# Patient Record
Sex: FEMALE | Race: WHITE | NOT HISPANIC OR LATINO | Employment: OTHER | URBAN - METROPOLITAN AREA
[De-identification: names, ages, dates, MRNs, and addresses within clinical notes are randomized per-mention and may not be internally consistent; named-entity substitution may affect disease eponyms.]

---

## 2017-01-04 ENCOUNTER — TELEPHONE (OUTPATIENT)
Dept: OBSTETRICS AND GYNECOLOGY | Facility: CLINIC | Age: 42
End: 2017-01-04

## 2017-01-04 NOTE — TELEPHONE ENCOUNTER
----- Message from Rebeka Fowler sent at 1/3/2017  8:08 AM CST -----  Contact: pt   X_  1st Request  _  2nd Request  _  3rd Request        Who:HUMPHREY ESPOSITO [[id number]]    Why: Patient states she would like to cancel her surgery     What Number to Call Back: [phone]    When to Expect a call back: (Before the end of the day)   -- if call after 3:00 call back will be tomorrow.

## 2019-10-07 ENCOUNTER — HOSPITAL ENCOUNTER (EMERGENCY)
Facility: HOSPITAL | Age: 44
Discharge: HOME | End: 2019-10-07
Attending: EMERGENCY MEDICINE
Payer: COMMERCIAL

## 2019-10-07 VITALS
DIASTOLIC BLOOD PRESSURE: 80 MMHG | OXYGEN SATURATION: 98 % | RESPIRATION RATE: 20 BRPM | BODY MASS INDEX: 25.69 KG/M2 | TEMPERATURE: 98 F | HEIGHT: 63 IN | WEIGHT: 145 LBS | HEART RATE: 82 BPM | SYSTOLIC BLOOD PRESSURE: 142 MMHG

## 2019-10-07 DIAGNOSIS — K59.00 CONSTIPATION, UNSPECIFIED CONSTIPATION TYPE: Primary | ICD-10-CM

## 2019-10-07 DIAGNOSIS — K59.00 CONSTIPATION: ICD-10-CM

## 2019-10-07 LAB
B-HCG UR QL: NEGATIVE
CTP QC/QA: YES

## 2019-10-07 PROCEDURE — 81025 URINE PREGNANCY TEST: CPT | Performed by: EMERGENCY MEDICINE

## 2019-10-07 PROCEDURE — 99284 EMERGENCY DEPT VISIT MOD MDM: CPT | Mod: 25

## 2019-10-07 RX ORDER — GLYCERIN 1 G/1
1 SUPPOSITORY RECTAL
Qty: 1 SUPPOSITORY | Refills: 0 | Status: SHIPPED | OUTPATIENT
Start: 2019-10-07 | End: 2020-03-02

## 2019-10-07 NOTE — ED NOTES
Pt reports that she is unable to have a BM. She reports she has taken stool softeners, fleets enemas, having pain while trying to have a BM. Had gastroenteritis last week. Pt has hx of Crohn's Disease    APPEARANCE: Alert, oriented and in no acute distress.  CARDIAC: Normal rate and rhythm, no murmur heard.   PERIPHERAL VASCULAR: peripheral pulses present. Normal cap refill. No edema. Warm to touch.    RESPIRATORY:Normal rate and effort, breath sounds clear bilaterally throughout chest. Respirations are equal and unlabored no obvious signs of distress.  GASTRO: soft, bowel sounds hypoactive, Tenderness and bloating to abdomen over entire abdomen, no abdominal distention. No BM for several days despite taking laxatives, stool softeners and fleets enemas. +Nausea. Pt has hx of Crohn's Disease  MUSC: Full ROM. No bony tenderness or soft tissue tenderness. No obvious deformity.  SKIN: Skin is warm and dry, normal skin turgor, mucous membranes moist.  NEURO: 5/5 strength major flexors/extensors bilaterally. Sensory intact to light touch bilaterally. [place] coma scale: eyes open spontaneously-4, oriented & converses-5, obeys commands-6. No neurological abnormalities.   MENTAL STATUS: awake, alert and aware of environment.  EYE: PERRL, both eyes: pupils brisk and reactive to light. Normal size.  ENT: EARS: no obvious drainage. NOSE: no active bleeding.

## 2019-10-07 NOTE — ED NOTES
Pt had to go to the bathroom during Dr. Au's evaluation. Pt directed to bathroom and given a urine cup for obtaining specimen.

## 2019-10-07 NOTE — ED PROVIDER NOTES
Encounter Date: 10/7/2019    SCRIBE #1 NOTE: I, Donn Palmer, am scribing for, and in the presence of, Lele Au MD.       History     Chief Complaint   Patient presents with    Constipation     States h/o gastroenteritis with N/V last week. No diarrhea reported. States now feels constipated with LBM last Tuesday. Taking laxatives and enema x 1 without results. Feels bloated and uncomfortable. No current N/V.      Rupal Chacon is a 44 y.o. female who  has a past medical history of Abnormal Pap smear (2012), Anemia, Crohn disease, and Crohn's disease.    The patient presents to the ED due to constipation starting 6 days ago.   Patient reports recent illness with nausea and vomiting last week (currently resolved). States she feels constipated and is concerned she may be impacted. Her last BM was about 6 days ago. States she normally has a BM daily. She has tried Dulcolax and a Fleet enema this morning without relief. She admits to urgency but has not had a full BM. She also reports some nausea and light-headedness this morning due to straining but denies any pain or bleeding. States she feels bloated and uncomfortable due to abdominal pressure. She has been passing lots of gas. She reports history of hemorrhoids. She denies history of impaction.    The history is provided by the patient.     Review of patient's allergies indicates:  No Known Allergies  Past Medical History:   Diagnosis Date    Abnormal Pap smear 2012    colpo with Dr. Salcedo    Anemia     teenager    Crohn disease     Crohn's disease     diagnosed 12/2013     Past Surgical History:   Procedure Laterality Date    COLPOSCOPY  2013    SPINAL FUSION  17 yo    scoliosis    thoracic spinal fusion      scoliosis     Family History   Problem Relation Age of Onset    Diabetes Mother     Hypertension Mother     Hyperlipidemia Mother     Hypertension Father     Cancer Paternal Grandfather         leukemia    Melanoma Neg Hx      Psoriasis Neg Hx     Lupus Neg Hx      Social History     Tobacco Use    Smoking status: Never Smoker    Smokeless tobacco: Never Used   Substance Use Topics    Alcohol use: Yes     Comment: occas/socially    Drug use: No     Review of Systems   Constitutional: Negative for fever.   HENT: Negative for sore throat.    Respiratory: Negative for shortness of breath.    Cardiovascular: Negative for chest pain.   Gastrointestinal: Positive for abdominal distention, abdominal pain, constipation, nausea and vomiting (currently resolved). Negative for blood in stool.   Genitourinary: Negative for dysuria.   Musculoskeletal: Negative for back pain.   Skin: Negative for rash.   Neurological: Positive for light-headedness. Negative for weakness.   Hematological: Does not bruise/bleed easily.   All other systems reviewed and are negative.      Physical Exam     Initial Vitals [10/07/19 0815]   BP Pulse Resp Temp SpO2   (!) 141/88 80 16 98.4 °F (36.9 °C) 98 %      MAP       --         Physical Exam    Nursing note and vitals reviewed.  Constitutional: She appears well-developed and well-nourished. No distress.   HENT:   Head: Normocephalic and atraumatic.   Eyes: EOM are normal. Pupils are equal, round, and reactive to light.   Neck: Normal range of motion. Neck supple.   Cardiovascular: Normal rate, regular rhythm, normal heart sounds and intact distal pulses.   Pulmonary/Chest: Breath sounds normal. No respiratory distress. She has no wheezes.   Abdominal: Soft. She exhibits no distension. There is no tenderness.   Genitourinary:   Genitourinary Comments: Multiple external hemorrhoids noted on BHUPENDRA.   No evidence of rectal impaction.  No stool palpated in rectal vault   Musculoskeletal: Normal range of motion. She exhibits no edema.   Neurological: She is alert and oriented to person, place, and time.   Skin: Skin is warm and dry.         ED Course   Procedures  Labs Reviewed   POCT URINE PREGNANCY          Imaging Results           X-Ray Abdomen AP 1 View (KUB) (Final result)  Result time 10/07/19 09:49:52    Final result by Ayesha Chavez MD (10/07/19 09:49:52)                 Impression:      See above.      Electronically signed by: Ayesha Chavez MD  Date:    10/07/2019  Time:    09:49             Narrative:    EXAMINATION:  XR ABDOMEN AP 1 VIEW    CLINICAL HISTORY:  Constipation, unspecified    TECHNIQUE:  AP View(s) of the abdomen was performed.    COMPARISON:  None    FINDINGS:  There is copious stool in the ascending and transverse colon.  No dilated bowel loops are seen to indicate obstruction.  There is dextroscoliosis and a spinal fusion rhonda is partially visualized.                                 Medical Decision Making:   Initial Assessment:   44 year-old female with history of Crohn's disease presents due to constipation starting 6 days ago.vsswnl.  PE noted for no stool in rectal vault.  Multiple hemorrhoids and likely scar tissue palpable.  Differential Diagnosis:   Ddx possible impaction, constipation, pregnancy  Clinical Tests:   Lab Tests: Ordered and Reviewed  Radiological Study: Ordered and Reviewed  ED Management:  Plan:  Obtain x-ray and enema glycerin suppository.  Reassess.                   ED Course as of Oct 07 1105   Mon Oct 07, 2019   1022 Patient without evidence of impaction abdomen remains benign and nontender to palpation soft throughout patient tolerating p.o. intake without any nausea or vomiting x-ray evident of large stool burden.  Offered patient an enema and suppository while in the ED prefers to attempt relieve the constipation while at home.  Patient reliable given follow-up instructions and precautions will DC home.  Patient has no further questions.    [DC]      ED Course User Index  [DC] Lele Au Jr., MD     Clinical Impression:       ICD-10-CM ICD-9-CM   1. Constipation, unspecified constipation type K59.00 564.00   2. Constipation K59.00 564.00           Disposition:    Disposition: Discharged  Condition: Stable         I, Lele Au,  personally performed the services described in this documentation. All medical record entries made by the scribe were at my direction and in my presence.  I have reviewed the chart and agree that the record reflects my personal performance and is accurate and complete. Lele Au M.D .now                   Lele Au Jr., MD  10/07/19 0745

## 2019-10-07 NOTE — ED NOTES
Dr. Au at bedside. Rectal exam performed with this nurse as chaperone. No impaction found. Xray will be ordered.

## 2020-03-02 ENCOUNTER — HOSPITAL ENCOUNTER (OUTPATIENT)
Dept: PREADMISSION TESTING | Facility: HOSPITAL | Age: 45
Discharge: HOME | End: 2020-03-02
Attending: OBSTETRICS & GYNECOLOGY
Payer: COMMERCIAL

## 2020-03-02 ENCOUNTER — LAB VISIT (OUTPATIENT)
Dept: LAB | Facility: HOSPITAL | Age: 45
End: 2020-03-02
Attending: OBSTETRICS & GYNECOLOGY
Payer: COMMERCIAL

## 2020-03-02 VITALS
BODY MASS INDEX: 26.98 KG/M2 | OXYGEN SATURATION: 100 % | WEIGHT: 158.06 LBS | TEMPERATURE: 99 F | HEART RATE: 64 BPM | DIASTOLIC BLOOD PRESSURE: 83 MMHG | SYSTOLIC BLOOD PRESSURE: 136 MMHG | HEIGHT: 64 IN | RESPIRATION RATE: 16 BRPM

## 2020-03-02 DIAGNOSIS — Z01.818 PREOP TESTING: Primary | ICD-10-CM

## 2020-03-02 LAB
ABO + RH BLD: NORMAL
BLD GP AB SCN CELLS X3 SERPL QL: NORMAL
ERYTHROCYTE [DISTWIDTH] IN BLOOD BY AUTOMATED COUNT: 12.5 % (ref 11.5–14.5)
HCG SERPL QL: NEGATIVE
HCT VFR BLD AUTO: 40.4 % (ref 37–48.5)
HGB BLD-MCNC: 13.4 G/DL (ref 12–16)
MCH RBC QN AUTO: 29.5 PG (ref 27–31)
MCHC RBC AUTO-ENTMCNC: 33.2 G/DL (ref 32–36)
MCV RBC AUTO: 89 FL (ref 82–98)
PLATELET # BLD AUTO: 305 K/UL (ref 150–350)
PMV BLD AUTO: 9.8 FL (ref 9.2–12.9)
RBC # BLD AUTO: 4.55 M/UL (ref 4–5.4)
WBC # BLD AUTO: 10.81 K/UL (ref 3.9–12.7)

## 2020-03-02 PROCEDURE — 86850 RBC ANTIBODY SCREEN: CPT

## 2020-03-02 PROCEDURE — 85027 COMPLETE CBC AUTOMATED: CPT

## 2020-03-02 PROCEDURE — 36415 COLL VENOUS BLD VENIPUNCTURE: CPT

## 2020-03-02 PROCEDURE — 84703 CHORIONIC GONADOTROPIN ASSAY: CPT

## 2020-03-02 PROCEDURE — 93005 ELECTROCARDIOGRAM TRACING: CPT

## 2020-03-02 RX ORDER — AMOXICILLIN 500 MG
CAPSULE ORAL
COMMUNITY

## 2020-03-02 RX ORDER — CHOLECALCIFEROL (VITAMIN D3) 25 MCG
1000 TABLET ORAL DAILY
COMMUNITY

## 2020-03-02 RX ORDER — IRON 18 MG
50 TABLET ORAL DAILY
COMMUNITY

## 2020-03-02 NOTE — DISCHARGE INSTRUCTIONS
Preventing Falls: In the Hospital     Make sure you know how to call for help while in the hospital.     At some point, you may need care in a hospital or other facility. People may ask how well you can move around. Answer this question honestly. If you have a high risk of falling, the staff will take extra steps to help keep you safe. Remember, always ask for help when you need it. Here are some tips to keep you safe in the hospital.  Keep things within reach  · Keep the things you use often within easy reach, like tissues, water, remote control, light cord, and call bell.  · With the nurse present, practice using the call button before you really need it. Keep it within reach. And don't be afraid to use it when you need to!  · Know how to turn the light on and off from your bed. Also, know how to use the bed control.  Get help to move around  · Don't get up on your own, even to use the bathroom. Call someone to help.  · Sit up slowly and with help.  · Don't try to move IV poles or other equipment on your own.  · Use your walking aid as instructed by the staff. Be sure to use handrails in bathrooms or in hallways.  · The staff may use a gait belt to keep you safe as you move around. This fits snugly around your waist. It allows another person to support you as you walk together.  A note to family and friends  When someone is ill or in the hospital, falling is more likely. You can help your loved one reduce the risk:  · Keep personal items in the same place. Stick with a routine.  · Learn about the guidelines the staff has in place to prevent falls. Follow them.  · Get guidance on using safety equipment and moving your loved one.  · When directing your loved one, keep it simple. Go one step at a time.  · Notify staff about any mental or physical changes you notice in your loved one.   Date Last Reviewed: 6/13/2015  © 1879-0278 [employer]. [address] [place] [address], [place] [address]. All rights  reserved. This information is not intended as a substitute for professional medical care. Always follow your healthcare professional's instructions.

## 2020-03-09 ENCOUNTER — ANESTHESIA EVENT (OUTPATIENT)
Dept: SURGERY | Facility: HOSPITAL | Age: 45
End: 2020-03-09
Payer: COMMERCIAL

## 2020-03-09 ENCOUNTER — HOSPITAL ENCOUNTER (OUTPATIENT)
Facility: HOSPITAL | Age: 45
Discharge: HOME | End: 2020-03-09
Attending: OBSTETRICS & GYNECOLOGY | Admitting: OBSTETRICS & GYNECOLOGY
Payer: COMMERCIAL

## 2020-03-09 ENCOUNTER — ANESTHESIA (OUTPATIENT)
Dept: SURGERY | Facility: HOSPITAL | Age: 45
End: 2020-03-09
Payer: COMMERCIAL

## 2020-03-09 VITALS
RESPIRATION RATE: 18 BRPM | OXYGEN SATURATION: 96 % | TEMPERATURE: 98 F | SYSTOLIC BLOOD PRESSURE: 124 MMHG | HEART RATE: 54 BPM | DIASTOLIC BLOOD PRESSURE: 72 MMHG

## 2020-03-09 DIAGNOSIS — Z01.818 PREOP TESTING: ICD-10-CM

## 2020-03-09 PROCEDURE — 71000016 HC POSTOP RECOV ADDL HR: Performed by: OBSTETRICS & GYNECOLOGY

## 2020-03-09 PROCEDURE — 25000003 PHARM REV CODE 250

## 2020-03-09 PROCEDURE — 25000003 PHARM REV CODE 250: Performed by: STUDENT IN AN ORGANIZED HEALTH CARE EDUCATION/TRAINING PROGRAM

## 2020-03-09 PROCEDURE — 27201423 OPTIME MED/SURG SUP & DEVICES STERILE SUPPLY: Performed by: OBSTETRICS & GYNECOLOGY

## 2020-03-09 PROCEDURE — 25000003 PHARM REV CODE 250: Performed by: OBSTETRICS & GYNECOLOGY

## 2020-03-09 PROCEDURE — 63600175 PHARM REV CODE 636 W HCPCS: Performed by: STUDENT IN AN ORGANIZED HEALTH CARE EDUCATION/TRAINING PROGRAM

## 2020-03-09 PROCEDURE — 27202107 HC XP QUATRO SENSOR: Performed by: STUDENT IN AN ORGANIZED HEALTH CARE EDUCATION/TRAINING PROGRAM

## 2020-03-09 PROCEDURE — 27000673 HC TUBING BLOOD Y: Performed by: STUDENT IN AN ORGANIZED HEALTH CARE EDUCATION/TRAINING PROGRAM

## 2020-03-09 PROCEDURE — 37000008 HC ANESTHESIA 1ST 15 MINUTES: Performed by: OBSTETRICS & GYNECOLOGY

## 2020-03-09 PROCEDURE — 71000015 HC POSTOP RECOV 1ST HR: Performed by: OBSTETRICS & GYNECOLOGY

## 2020-03-09 PROCEDURE — 36000707: Performed by: OBSTETRICS & GYNECOLOGY

## 2020-03-09 PROCEDURE — 27201107 HC STYLET, STANDARD: Performed by: STUDENT IN AN ORGANIZED HEALTH CARE EDUCATION/TRAINING PROGRAM

## 2020-03-09 PROCEDURE — 25000003 PHARM REV CODE 250: Performed by: NURSE ANESTHETIST, CERTIFIED REGISTERED

## 2020-03-09 PROCEDURE — 63600175 PHARM REV CODE 636 W HCPCS: Performed by: NURSE ANESTHETIST, CERTIFIED REGISTERED

## 2020-03-09 PROCEDURE — 71000033 HC RECOVERY, INTIAL HOUR: Performed by: OBSTETRICS & GYNECOLOGY

## 2020-03-09 PROCEDURE — 71000039 HC RECOVERY, EACH ADD'L HOUR: Performed by: OBSTETRICS & GYNECOLOGY

## 2020-03-09 PROCEDURE — 37000009 HC ANESTHESIA EA ADD 15 MINS: Performed by: OBSTETRICS & GYNECOLOGY

## 2020-03-09 PROCEDURE — 36000706: Performed by: OBSTETRICS & GYNECOLOGY

## 2020-03-09 RX ORDER — MIDAZOLAM HYDROCHLORIDE 1 MG/ML
INJECTION INTRAMUSCULAR; INTRAVENOUS
Status: DISCONTINUED | OUTPATIENT
Start: 2020-03-09 | End: 2020-03-09

## 2020-03-09 RX ORDER — OXYCODONE HYDROCHLORIDE 5 MG/1
5 TABLET ORAL EVERY 4 HOURS PRN
Status: DISCONTINUED | OUTPATIENT
Start: 2020-03-09 | End: 2020-03-09 | Stop reason: HOSPADM

## 2020-03-09 RX ORDER — DEXAMETHASONE SODIUM PHOSPHATE 4 MG/ML
INJECTION, SOLUTION INTRA-ARTICULAR; INTRALESIONAL; INTRAMUSCULAR; INTRAVENOUS; SOFT TISSUE
Status: DISCONTINUED | OUTPATIENT
Start: 2020-03-09 | End: 2020-03-09

## 2020-03-09 RX ORDER — NEOSTIGMINE METHYLSULFATE 1 MG/ML
INJECTION, SOLUTION INTRAVENOUS
Status: DISCONTINUED | OUTPATIENT
Start: 2020-03-09 | End: 2020-03-09

## 2020-03-09 RX ORDER — ONDANSETRON 2 MG/ML
INJECTION INTRAMUSCULAR; INTRAVENOUS
Status: DISCONTINUED | OUTPATIENT
Start: 2020-03-09 | End: 2020-03-09

## 2020-03-09 RX ORDER — GLYCOPYRROLATE 0.2 MG/ML
INJECTION INTRAMUSCULAR; INTRAVENOUS
Status: DISCONTINUED | OUTPATIENT
Start: 2020-03-09 | End: 2020-03-09

## 2020-03-09 RX ORDER — ACETAMINOPHEN 500 MG
1000 TABLET ORAL ONCE
Status: COMPLETED | OUTPATIENT
Start: 2020-03-09 | End: 2020-03-09

## 2020-03-09 RX ORDER — VASOPRESSIN 20 [USP'U]/ML
INJECTION, SOLUTION INTRAMUSCULAR; SUBCUTANEOUS
Status: DISCONTINUED | OUTPATIENT
Start: 2020-03-09 | End: 2020-03-09 | Stop reason: HOSPADM

## 2020-03-09 RX ORDER — ONDANSETRON 2 MG/ML
4 INJECTION INTRAMUSCULAR; INTRAVENOUS ONCE
Status: DISCONTINUED | OUTPATIENT
Start: 2020-03-09 | End: 2020-03-09 | Stop reason: HOSPADM

## 2020-03-09 RX ORDER — SODIUM CHLORIDE 0.9 % (FLUSH) 0.9 %
10 SYRINGE (ML) INJECTION
Status: DISCONTINUED | OUTPATIENT
Start: 2020-03-09 | End: 2020-03-09 | Stop reason: HOSPADM

## 2020-03-09 RX ORDER — SUCCINYLCHOLINE CHLORIDE 20 MG/ML
INJECTION INTRAMUSCULAR; INTRAVENOUS
Status: DISCONTINUED | OUTPATIENT
Start: 2020-03-09 | End: 2020-03-09

## 2020-03-09 RX ORDER — IBUPROFEN 200 MG
600 TABLET ORAL EVERY 6 HOURS PRN
Status: DISCONTINUED | OUTPATIENT
Start: 2020-03-09 | End: 2020-03-09 | Stop reason: HOSPADM

## 2020-03-09 RX ORDER — HYDROMORPHONE HYDROCHLORIDE 1 MG/ML
0.2 INJECTION, SOLUTION INTRAMUSCULAR; INTRAVENOUS; SUBCUTANEOUS
Status: DISCONTINUED | OUTPATIENT
Start: 2020-03-09 | End: 2020-03-09 | Stop reason: HOSPADM

## 2020-03-09 RX ORDER — OXYCODONE HYDROCHLORIDE 5 MG/1
5 TABLET ORAL
Status: DISCONTINUED | OUTPATIENT
Start: 2020-03-09 | End: 2020-03-09 | Stop reason: HOSPADM

## 2020-03-09 RX ORDER — OXYCODONE AND ACETAMINOPHEN 10; 325 MG/1; MG/1
1 TABLET ORAL EVERY 4 HOURS PRN
Status: DISCONTINUED | OUTPATIENT
Start: 2020-03-09 | End: 2020-03-09 | Stop reason: HOSPADM

## 2020-03-09 RX ORDER — DIPHENHYDRAMINE HYDROCHLORIDE 50 MG/ML
25 INJECTION INTRAMUSCULAR; INTRAVENOUS EVERY 6 HOURS PRN
Status: DISCONTINUED | OUTPATIENT
Start: 2020-03-09 | End: 2020-03-09 | Stop reason: HOSPADM

## 2020-03-09 RX ORDER — FENTANYL CITRATE 50 UG/ML
INJECTION, SOLUTION INTRAMUSCULAR; INTRAVENOUS
Status: DISCONTINUED | OUTPATIENT
Start: 2020-03-09 | End: 2020-03-09

## 2020-03-09 RX ORDER — LIDOCAINE HYDROCHLORIDE 20 MG/ML
INJECTION, SOLUTION EPIDURAL; INFILTRATION; INTRACAUDAL; PERINEURAL
Status: DISCONTINUED | OUTPATIENT
Start: 2020-03-09 | End: 2020-03-09

## 2020-03-09 RX ORDER — ROCURONIUM BROMIDE 10 MG/ML
INJECTION, SOLUTION INTRAVENOUS
Status: DISCONTINUED | OUTPATIENT
Start: 2020-03-09 | End: 2020-03-09

## 2020-03-09 RX ORDER — OXYCODONE HYDROCHLORIDE 5 MG/1
5 TABLET ORAL EVERY 4 HOURS PRN
Qty: 30 TABLET | Refills: 0 | Status: SHIPPED | OUTPATIENT
Start: 2020-03-09

## 2020-03-09 RX ORDER — ONDANSETRON 2 MG/ML
4 INJECTION INTRAMUSCULAR; INTRAVENOUS DAILY PRN
Status: COMPLETED | OUTPATIENT
Start: 2020-03-09 | End: 2020-03-09

## 2020-03-09 RX ORDER — MEPERIDINE HYDROCHLORIDE 50 MG/ML
12.5 INJECTION INTRAMUSCULAR; INTRAVENOUS; SUBCUTANEOUS EVERY 10 MIN PRN
Status: DISCONTINUED | OUTPATIENT
Start: 2020-03-09 | End: 2020-03-09 | Stop reason: HOSPADM

## 2020-03-09 RX ORDER — SODIUM CHLORIDE, SODIUM LACTATE, POTASSIUM CHLORIDE, CALCIUM CHLORIDE 600; 310; 30; 20 MG/100ML; MG/100ML; MG/100ML; MG/100ML
INJECTION, SOLUTION INTRAVENOUS CONTINUOUS PRN
Status: DISCONTINUED | OUTPATIENT
Start: 2020-03-09 | End: 2020-03-09

## 2020-03-09 RX ADMIN — DEXAMETHASONE SODIUM PHOSPHATE 8 MG: 4 INJECTION, SOLUTION INTRAMUSCULAR; INTRAVENOUS at 07:03

## 2020-03-09 RX ADMIN — ACETAMINOPHEN 1000 MG: 500 TABLET, FILM COATED ORAL at 06:03

## 2020-03-09 RX ADMIN — ROCURONIUM BROMIDE 5 MG: 10 INJECTION, SOLUTION INTRAVENOUS at 07:03

## 2020-03-09 RX ADMIN — SUCCINYLCHOLINE CHLORIDE 140 MG: 20 INJECTION, SOLUTION INTRAMUSCULAR; INTRAVENOUS at 07:03

## 2020-03-09 RX ADMIN — LIDOCAINE HYDROCHLORIDE 100 MG: 20 INJECTION, SOLUTION EPIDURAL; INFILTRATION; INTRACAUDAL; PERINEURAL at 07:03

## 2020-03-09 RX ADMIN — ONDANSETRON HYDROCHLORIDE 4 MG: 2 SOLUTION INTRAMUSCULAR; INTRAVENOUS at 12:03

## 2020-03-09 RX ADMIN — FENTANYL CITRATE 50 MCG: 50 INJECTION INTRAMUSCULAR; INTRAVENOUS at 07:03

## 2020-03-09 RX ADMIN — NEOSTIGMINE METHYLSULFATE 4 MG: 1 INJECTION INTRAVENOUS at 08:03

## 2020-03-09 RX ADMIN — SODIUM CHLORIDE, SODIUM LACTATE, POTASSIUM CHLORIDE, AND CALCIUM CHLORIDE: .6; .31; .03; .02 INJECTION, SOLUTION INTRAVENOUS at 08:03

## 2020-03-09 RX ADMIN — MIDAZOLAM 2 MG: 1 INJECTION INTRAMUSCULAR; INTRAVENOUS at 07:03

## 2020-03-09 RX ADMIN — ONDANSETRON HYDROCHLORIDE 4 MG: 2 SOLUTION INTRAMUSCULAR; INTRAVENOUS at 09:03

## 2020-03-09 RX ADMIN — HYDROMORPHONE HYDROCHLORIDE 0.2 MG: 1 INJECTION, SOLUTION INTRAMUSCULAR; INTRAVENOUS; SUBCUTANEOUS at 09:03

## 2020-03-09 RX ADMIN — ONDANSETRON 4 MG: 2 INJECTION INTRAMUSCULAR; INTRAVENOUS at 07:03

## 2020-03-09 RX ADMIN — GLYCOPYRROLATE 0.4 MG: 0.2 INJECTION INTRAMUSCULAR; INTRAVENOUS at 08:03

## 2020-03-09 RX ADMIN — ROCURONIUM BROMIDE 20 MG: 10 INJECTION, SOLUTION INTRAVENOUS at 07:03

## 2020-03-09 RX ADMIN — SODIUM CHLORIDE, SODIUM LACTATE, POTASSIUM CHLORIDE, AND CALCIUM CHLORIDE: .6; .31; .03; .02 INJECTION, SOLUTION INTRAVENOUS at 07:03

## 2020-03-09 NOTE — ANESTHESIA PREPROCEDURE EVALUATION
03/09/2020  Rupla Chacon is a 44 y.o., female   Patient Active Problem List   Diagnosis    Crohn disease       Past Surgical History:   Procedure Laterality Date    COLONOSCOPY      x 2    COLPOSCOPY  2013    SPINAL FUSION  15 yo    scoliosis    thoracic spinal fusion      scoliosis    VAGINAL DELIVERY      x 2        Tobacco Use:  The patient  reports that she has never smoked. She has never used smokeless tobacco.     Results for orders placed or performed during the hospital encounter of 03/02/20   EKG 12-lead    Collection Time: 03/02/20 11:52 AM    Narrative    Test Reason : Z01.818,    Vent. Rate : 066 BPM     Atrial Rate : 066 BPM     P-R Int : 156 ms          QRS Dur : 090 ms      QT Int : 414 ms       P-R-T Axes : 068 012 035 degrees     QTc Int : 434 ms    Normal sinus rhythm  Normal ECG  No previous ECGs available  Confirmed by Joaquin Mccoy MD ([id number]) on 3/3/2020 9:04:31 AM    Referred By: JOYCE LOWE           Confirmed By:Joaquin Mccoy MD             Lab Results   Component Value Date    WBC 10.81 03/02/2020    HGB 13.4 03/02/2020    HCT 40.4 03/02/2020    MCV 89 03/02/2020     03/02/2020     BMP  Lab Results   Component Value Date     (L) 10/28/2013    K 3.7 10/28/2013     10/28/2013    CO2 23 10/28/2013    BUN 11 10/28/2013    CREATININE 0.8 10/28/2013    CALCIUM 9.0 10/28/2013    ANIONGAP 8 10/28/2013    ESTGFRAFRICA >60.0 10/28/2013    EGFRNONAA >60.0 10/28/2013             Pre-op Assessment    I have reviewed the Patient Summary Reports.    I have reviewed the Nursing Notes.   I have reviewed the Medications.     Review of Systems  Anesthesia Hx:  No problems with previous Anesthesia  Denies Family Hx of Anesthesia complications.   Denies Personal Hx of Anesthesia complications.   Social:  Non-Smoker, No Alcohol Use    Cardiovascular:   Exercise tolerance:  good Murmur - never had an echo, denies palpitations, chest pain, SOB/LOZANO Functional Capacity good / => 4 METS    Pulmonary:   Denies COPD.  Denies Asthma.  Denies Shortness of breath.  Denies Recent URI. Seasonal allergies, post-nasal drip   Hepatic/GI:   Denies GERD. Denies Liver Disease. Crohn's   Musculoskeletal:   Scoliosis s/p thoracic fusion   Neurological:  Neurology Normal    Endocrine:  Endocrine Normal        Physical Exam  General:  Well nourished    Airway/Jaw/Neck:  Airway Findings: Mouth Opening: Normal Mallampati: II  TM Distance: Normal, at least 6 cm  Jaw/Neck Findings:  Neck ROM: Normal ROM      Dental:  Dental Findings: In tact   Chest/Lungs:  Chest/Lungs Findings: Clear to auscultation, Normal Respiratory Rate     Heart/Vascular:  Heart Findings: Rate: Normal  Rhythm: Regular Rhythm  Heart Murmur  Systolic  Diastolic Heart Murmur Grade II        Mental Status:  Mental Status Findings:  Cooperative, Alert and Oriented         Anesthesia Plan  Type of Anesthesia, risks & benefits discussed:  Anesthesia Type:  general  Patient's Preference:   Intra-op Monitoring Plan: standard ASA monitors  Intra-op Monitoring Plan Comments:   Post Op Pain Control Plan: multimodal analgesia  Post Op Pain Control Plan Comments:   Induction:   IV  Beta Blocker:  Patient is not currently on a Beta-Blocker (No further documentation required).       Informed Consent: Patient understands risks and agrees with Anesthesia plan.  Questions answered. Anesthesia consent signed with patient.  ASA Score: 2     Day of Surgery Review of History & Physical:    H&P update referred to the surgeon.     Anesthesia Plan Notes: Multimodal: Acetaminophen 1000mg PO preop. Toradol in recovery if surgeon amenable.  PONV prophylaxis: Zofran 4mg, decadron 8mg  CHELSY risk low

## 2020-03-09 NOTE — ANESTHESIA POSTPROCEDURE EVALUATION
Anesthesia Post Evaluation    Patient: Rupal Chacon    Procedure(s) Performed: Procedure(s) (LRB):  CONE BIOPSY, CERVIX, USING COLD KNIFE (N/A)    Final Anesthesia Type: general    Patient location during evaluation: PACU  Patient participation: Yes- Able to Participate  Level of consciousness: awake and alert  Post-procedure vital signs: reviewed and stable  Pain management: adequate  Airway patency: patent  CHELSY mitigation strategies: Multimodal analgesia, Extubation while patient is awake, Verification of full reversal of neuromuscular block and Extubation and recovery carried out in lateral, semiupright, or other nonsupine position  PONV status at discharge: No PONV  Anesthetic complications: no      Cardiovascular status: hemodynamically stable  Respiratory status: unassisted, spontaneous ventilation and room air  Hydration status: euvolemic  Follow-up not needed.          Vitals Value Taken Time   /57 3/9/2020  9:45 AM   Temp 36.9 °C (98.5 °F) 3/9/2020  5:45 AM   Pulse 57 3/9/2020  9:55 AM   Resp 65 3/9/2020  9:55 AM   SpO2 99 % 3/9/2020  9:55 AM   Vitals shown include unvalidated device data.      No case tracking events are documented in the log.      Pain/Mirna Score: Pain Rating Prior to Med Admin: 2 (3/9/2020  9:15 AM)  Mirna Score: 10 (3/9/2020  9:15 AM)

## 2020-03-09 NOTE — TRANSFER OF CARE
Anesthesia Transfer of Care Note    Patient: Rupal Chacon    Procedure(s) Performed: Procedure(s) (LRB):  CONE BIOPSY, CERVIX, USING COLD KNIFE (N/A)    Patient location: PACU    Anesthesia Type: general    Transport from OR: Transported from OR on room air with adequate spontaneous ventilation    Post pain: adequate analgesia    Post assessment: no apparent anesthetic complications    Post vital signs: stable    Level of consciousness: awake, alert and oriented    Nausea/Vomiting: no nausea/vomiting    Complications: none    Transfer of care protocol was followed      Last vitals:   Visit Vitals  /87   Pulse 69   Temp 36.9 °C (98.5 °F) (Oral)   Resp 16   LMP 03/09/2020 (Exact Date)   SpO2 99%   Breastfeeding? No

## 2020-03-09 NOTE — DISCHARGE INSTRUCTIONS
No driving for 24 hours.  Pelvic rest for 2 weeks until MD follow up. No intercourse, tampons or douching.  Notify MD for fever greater than 100.4.  Notify MD for heavy vaginal bleeding soaking more than 1 pad per hour.      LEEP  LEEP stands for loop electrosurgical excision procedure. It is used to treat dysplasia (abnormal cell growth). A fine wire loop is used to remove a small amount of tissue from your cervix. This can be done in the healthcare providers office. You can go back to your routine the same day. Schedule your LEEP for a time when you are not menstruating.  During the procedure  Youll place your feet in stirrups. Your healthcare provider then inserts a speculum into your vagina. The speculum holds the walls of the vagina open to let the health care provider see the cervix:  · Your cervix is numbed with a local anesthetic.  · A mild vinegar or iodine solution may be applied to your cervix. This helps to highlight any dysplasia.  · Your healthcare provider may look through a colposcope. This helps him or her to get a close-up view of your cervix.  · The loop is inserted through your vagina and moved toward the cervix.  · The loop is used to remove a small piece of cervical tissue.  · A medicated solution may be applied to the cervix. This helps reduce bleeding.     The loop is inserted.       Tissue is removed from the cervix.       Medicine is applied to reduce bleeding.      After the procedure  You may have a watery, pink discharge and mild cramping following the procedure. Also, the solution used to decrease bleeding may cause a dark, vaginal discharge for a few days. Do not place anything in your vagina or have intercourse until your healthcare provider tells you it is OK. Your cervix should heal completely within a few weeks.  When to call your healthcare provider  Call your healthcare provider right away if you have any of the following:  · Heavy bleeding or bleeding with clots  · Severe  belly pain  · Fever   Date Last Reviewed: 12/1/2016  © 3819-7857 [employer]. [address] [place] [address], [place] [address]. All rights reserved. This information is not intended as a substitute for professional medical care. Always follow your healthcare professional's instructions.

## 2020-03-09 NOTE — OP NOTE
Preop diagnosis-severe cervical dysplasia  Postop diagnosis-same  Procedure-cold knife conization of cervix  Surgeon-Antea Mcdonald  Anesthesia-general  Complications-none  Blood loss-10 mL  Intraoperative findings-the vulva vagina and cervix all appeared grossly normal.  The cervix did appear somewhat shortened from her previous LEEP procedure.  When Lugol solution was applied there was a small nonstaining area at 5:00 a.m. on the posterior lip of the cervix.    Procedure in detail-the risks benefits indications and alternatives of the procedure were discussed with the patient and informed consent was obtained.  She was taken operating room where general anesthesia was obtained without difficulty.  She was prepped and draped in normal sterile fashion in the dorsal lithotomy position using Lance stirrups and her bladder was drained with an in-and out catheter.  A weighted speculum was placed into the vagina and anterior lip of the cervix was grasped with a single-tooth tenaculum.  0 chromic figure-of-eight sutures were placed along the lateral cervix at 3:00 a.m. and 9:00 a.m..  A dilute solution of vasopressin was injected into the parametrial tissue to help with hemostasis.  The Lugol solution was applied with the above-mentioned findings noted.  A scalpel was used to remove a cone shaped piece of the cervix with the nonstaining areas included.  The specimen was marked at 12:00 p.m. and sent to pathology for permanent section.  An endocervical curettage was done and the specimen was sent to pathology also.  Bovie cautery was used on the wound bed and excellent hemostasis was seen.  A small amount of Monsel's solution was placed along the wound bed.  All instruments were removed from the vagina and cervix.  The patient tolerated procedure well.  Sponge lap needle and instrument counts were correct x2.  She was taken recovery in stable condition.

## 2020-05-11 ENCOUNTER — RESEARCH ENCOUNTER (OUTPATIENT)
Dept: RESEARCH | Facility: HOSPITAL | Age: 45
End: 2020-05-11

## 2020-05-11 ENCOUNTER — LAB VISIT (OUTPATIENT)
Dept: PRIMARY CARE CLINIC | Facility: CLINIC | Age: 45
End: 2020-05-11
Payer: COMMERCIAL

## 2020-05-11 DIAGNOSIS — Z00.6 RESEARCH STUDY PATIENT: Primary | ICD-10-CM

## 2020-05-11 PROCEDURE — 86769 SARS-COV-2 COVID-19 ANTIBODY: CPT

## 2020-05-11 PROCEDURE — U0002 COVID-19 LAB TEST NON-CDC: HCPCS

## 2020-05-11 NOTE — PROGRESS NOTES
Date of Consent: may 11, 2020    Sponsor: [facility]    Study Title/IRB Number: Observational study of Sars-CoV2 Immunoglobulin G (IgG) seroprevalence among the [facility] population over time 2020.163  Principle Investigator: Denise Falk, PhD    Did the patient need translation services? No   name: N/A    Prior to the Informed Consent (IC) being signed, or any study protocol required data collection, testing, procedure, or intervention being performed, the following was done and/or discussed:   Patient was given a paper copy of the IC for review    Patient was given study FAQ   Purpose of the study and qualifications to participate    Study design and tests or procedures done at this visit   Confidentiality and HIPAA Authorization for Release of Medical Records for the research trial/ subject's rights/research related injury   Risk, Benefits, Alternative Treatments, Compensation and Costs   Participation in the research trial is voluntary and patient may withdraw at anytime   Contact information for study related questions    Patient verbalizes understanding of the above: Yes  Contact information for PI and IRB given to patient: Yes  Patient able to adequately summarize: the purpose of the study, the risks associated with the study, and all procedures, testing, and follow-ups associated with the study: Yes    The consent was discussed verbally with the patient and all questions were answered satisfactorily. Patient gave verbal consent for the Seroprevalence research study with an IRB approval date of 05/08/2020.      The Consent, Consent Witness and name of Clinical Research Coordinator consenting was captured and documented in [employer]Cap.    All Inclusion and Exclusion Criteria reviewed, subject meets all Inclusion criteria and does not meet any Exclusion Criteria at this time.     Patient Eligibility was confirmed.    Patient responded to survey questions.    The following biospecimen  collection procedures were collected:    -Nasopharyngeal Swab Collection  -Blood collection

## 2020-05-12 DIAGNOSIS — U07.1 COVID-19 VIRUS DETECTED: ICD-10-CM

## 2020-05-12 LAB
SARS-COV-2 IGG SERPLBLD QL IA.RAPID: POSITIVE
SARS-COV-2 RNA RESP QL NAA+PROBE: DETECTED

## 2020-06-22 ENCOUNTER — TELEPHONE (OUTPATIENT)
Dept: RESEARCH | Facility: HOSPITAL | Age: 45
End: 2020-06-22

## 2020-06-22 NOTE — TELEPHONE ENCOUNTER
Patient was interested in donating plasma but stated she has a history of anemia, which makes her not eligible to donate plasma.

## 2021-04-15 ENCOUNTER — PATIENT MESSAGE (OUTPATIENT)
Dept: RESEARCH | Facility: HOSPITAL | Age: 46
End: 2021-04-15

## 2022-12-03 ENCOUNTER — OFFICE VISIT (OUTPATIENT)
Dept: URGENT CARE | Facility: CLINIC | Age: 47
End: 2022-12-03
Payer: COMMERCIAL

## 2022-12-03 VITALS
WEIGHT: 158 LBS | DIASTOLIC BLOOD PRESSURE: 75 MMHG | HEART RATE: 71 BPM | OXYGEN SATURATION: 97 % | TEMPERATURE: 99 F | HEIGHT: 64 IN | SYSTOLIC BLOOD PRESSURE: 134 MMHG | BODY MASS INDEX: 26.98 KG/M2 | RESPIRATION RATE: 19 BRPM

## 2022-12-03 DIAGNOSIS — J06.9 URI, ACUTE: ICD-10-CM

## 2022-12-03 DIAGNOSIS — J02.9 SORE THROAT: Primary | ICD-10-CM

## 2022-12-03 LAB
CTP QC/QA: YES
POC MOLECULAR INFLUENZA A AGN: NEGATIVE
POC MOLECULAR INFLUENZA B AGN: NEGATIVE

## 2022-12-03 PROCEDURE — 99213 PR OFFICE/OUTPT VISIT, EST, LEVL III, 20-29 MIN: ICD-10-PCS | Mod: S$GLB,,, | Performed by: FAMILY MEDICINE

## 2022-12-03 PROCEDURE — 3078F DIAST BP <80 MM HG: CPT | Mod: CPTII,S$GLB,, | Performed by: FAMILY MEDICINE

## 2022-12-03 PROCEDURE — 1159F MED LIST DOCD IN RCRD: CPT | Mod: CPTII,S$GLB,, | Performed by: FAMILY MEDICINE

## 2022-12-03 PROCEDURE — 99213 OFFICE O/P EST LOW 20 MIN: CPT | Mod: S$GLB,,, | Performed by: FAMILY MEDICINE

## 2022-12-03 PROCEDURE — 87502 POCT INFLUENZA A/B MOLECULAR: ICD-10-PCS | Mod: QW,S$GLB,, | Performed by: FAMILY MEDICINE

## 2022-12-03 PROCEDURE — 87502 INFLUENZA DNA AMP PROBE: CPT | Mod: QW,S$GLB,, | Performed by: FAMILY MEDICINE

## 2022-12-03 PROCEDURE — 3008F BODY MASS INDEX DOCD: CPT | Mod: CPTII,S$GLB,, | Performed by: FAMILY MEDICINE

## 2022-12-03 PROCEDURE — 3078F PR MOST RECENT DIASTOLIC BLOOD PRESSURE < 80 MM HG: ICD-10-PCS | Mod: CPTII,S$GLB,, | Performed by: FAMILY MEDICINE

## 2022-12-03 PROCEDURE — 3075F SYST BP GE 130 - 139MM HG: CPT | Mod: CPTII,S$GLB,, | Performed by: FAMILY MEDICINE

## 2022-12-03 PROCEDURE — 3075F PR MOST RECENT SYSTOLIC BLOOD PRESS GE 130-139MM HG: ICD-10-PCS | Mod: CPTII,S$GLB,, | Performed by: FAMILY MEDICINE

## 2022-12-03 PROCEDURE — 1159F PR MEDICATION LIST DOCUMENTED IN MEDICAL RECORD: ICD-10-PCS | Mod: CPTII,S$GLB,, | Performed by: FAMILY MEDICINE

## 2022-12-03 PROCEDURE — 3008F PR BODY MASS INDEX (BMI) DOCUMENTED: ICD-10-PCS | Mod: CPTII,S$GLB,, | Performed by: FAMILY MEDICINE

## 2022-12-03 RX ORDER — INFLUENZA A VIRUS A/DELAWARE/55/2019 CVR-45 (H1N1) ANTIGEN (MDCK CELL DERIVED, PROPIOLACTONE INACTIVATED), INFLUENZA A VIRUS A/DARWIN/11/2021 (H3N2) ANTIGEN (MDCK CELL DERIVED, PROPIOLACTONE INACTIVATED), INFLUENZA B VIRUS B/SINGAPORE/WUH4618/2021 ANTIGEN (MDCK CELL DERIVED, PROPIOLACTONE INACTIVATED), INFLUENZA B VIRUS B/SINGAPORE/INFTT-16-0610/2016 ANTIGEN (MDCK CELL DERIVED, PROPIOLACTONE INACTIVATED) 15; 15; 15; 15 UG/.5ML; UG/.5ML; UG/.5ML; UG/.5ML
INJECTION, SUSPENSION INTRAMUSCULAR
COMMUNITY
Start: 2022-09-10

## 2022-12-03 RX ORDER — LORATADINE 10 MG/1
10 TABLET ORAL DAILY
Qty: 30 TABLET | Refills: 0 | Status: SHIPPED | OUTPATIENT
Start: 2022-12-03 | End: 2022-12-25

## 2022-12-03 RX ORDER — BNT162B2 ORIGINAL AND OMICRON BA.4/BA.5 .1125; .1125 MG/2.25ML; MG/2.25ML
INJECTION, SUSPENSION INTRAMUSCULAR
COMMUNITY
Start: 2022-09-10

## 2022-12-03 NOTE — PROGRESS NOTES
"Subjective:       Patient ID: Rupal Chacon is a 47 y.o. female.    Vitals:  height is 5' 4" (1.626 m) and weight is 71.7 kg (158 lb). Her temperature is 98.5 °F (36.9 °C). Her blood pressure is 134/75 and her pulse is 71. Her respiration is 19 and oxygen saturation is 97%.     Chief Complaint: Cough and Sinus Problem    Pt present with symptoms Cough, Sinus drip and Headache X 1 week. Pt stated that she started feeling worse aprox. 2 days ago.Pt is vaccinated  Denies f/c      Cough  This is a new problem. The current episode started in the past 7 days. The problem has been gradually worsening. The problem occurs hourly. The cough is Productive of sputum. Associated symptoms include headaches and postnasal drip. Pertinent negatives include no ear congestion, ear pain or sore throat. Nothing aggravates the symptoms. She has tried OTC cough suppressant for the symptoms. The treatment provided mild relief. There is no history of asthma, bronchitis or COPD.   Sinus Problem  This is a new problem. The current episode started in the past 7 days. The problem has been gradually worsening since onset. There has been no fever. Her pain is at a severity of 6/10. The pain is mild. Associated symptoms include coughing, headaches and sinus pressure. Pertinent negatives include no ear pain or sore throat. Past treatments include oral decongestants. The treatment provided mild relief.     HENT:  Positive for postnasal drip and sinus pressure. Negative for ear pain and sore throat.    Respiratory:  Positive for cough.    Neurological:  Positive for headaches.     Objective:      Physical Exam   Constitutional: She is oriented to person, place, and time. She appears well-developed. She is cooperative.  Non-toxic appearance. She does not appear ill. No distress.   HENT:   Head: Normocephalic and atraumatic.   Ears:   Right Ear: Hearing, tympanic membrane, external ear and ear canal normal.   Left Ear: Hearing, tympanic membrane, " external ear and ear canal normal.   Nose: Nose normal. No mucosal edema, rhinorrhea or nasal deformity. No epistaxis. Right sinus exhibits no maxillary sinus tenderness and no frontal sinus tenderness. Left sinus exhibits no maxillary sinus tenderness and no frontal sinus tenderness.   Mouth/Throat: Uvula is midline, oropharynx is clear and moist and mucous membranes are normal. Mucous membranes are moist. No trismus in the jaw. Normal dentition. No uvula swelling. No oropharyngeal exudate. Oropharynx is clear.   Eyes: Conjunctivae and lids are normal. Pupils are equal, round, and reactive to light. Right eye exhibits no discharge. Left eye exhibits no discharge. No scleral icterus. Extraocular movement intact   Neck: Trachea normal and phonation normal. Neck supple.   Cardiovascular: Normal rate, regular rhythm, normal heart sounds and normal pulses.   Pulmonary/Chest: Effort normal and breath sounds normal. No respiratory distress.   Abdominal: Normal appearance and bowel sounds are normal. She exhibits no distension and no mass. Soft. There is no abdominal tenderness.   Musculoskeletal: Normal range of motion.         General: No deformity. Normal range of motion.   Neurological: She is alert and oriented to person, place, and time. She exhibits normal muscle tone. Coordination normal.   Skin: Skin is warm, dry, intact, not diaphoretic and not pale.   Psychiatric: Her speech is normal and behavior is normal. Judgment and thought content normal.   Nursing note and vitals reviewed.      Assessment:       1. Sore throat    2. URI, acute          Plan:         Sore throat  -     POCT Influenza A/B MOLECULAR    URI, acute    Other orders  -     loratadine (CLARITIN) 10 mg tablet; Take 1 tablet (10 mg total) by mouth once daily.  Dispense: 30 tablet; Refill: 0             Results for orders placed or performed in visit on 12/03/22   POCT Influenza A/B MOLECULAR   Result Value Ref Range    POC Molecular Influenza A Ag  Negative Negative, Not Reported    POC Molecular Influenza B Ag Negative Negative, Not Reported     Acceptable Yes

## 2023-04-17 ENCOUNTER — OFFICE VISIT (OUTPATIENT)
Dept: URGENT CARE | Facility: CLINIC | Age: 48
End: 2023-04-17
Payer: COMMERCIAL

## 2023-04-17 VITALS
HEART RATE: 86 BPM | RESPIRATION RATE: 19 BRPM | TEMPERATURE: 98 F | OXYGEN SATURATION: 98 % | WEIGHT: 158 LBS | BODY MASS INDEX: 26.98 KG/M2 | DIASTOLIC BLOOD PRESSURE: 92 MMHG | SYSTOLIC BLOOD PRESSURE: 136 MMHG | HEIGHT: 64 IN

## 2023-04-17 DIAGNOSIS — R52 BODY ACHES: ICD-10-CM

## 2023-04-17 DIAGNOSIS — U07.1 COVID-19: Primary | ICD-10-CM

## 2023-04-17 DIAGNOSIS — R09.89 CHEST CONGESTION: ICD-10-CM

## 2023-04-17 DIAGNOSIS — R05.9 COUGH, UNSPECIFIED TYPE: ICD-10-CM

## 2023-04-17 LAB
CTP QC/QA: YES
SARS-COV-2 AG RESP QL IA.RAPID: POSITIVE

## 2023-04-17 PROCEDURE — 87811 SARS CORONAVIRUS 2 ANTIGEN POCT, MANUAL READ: ICD-10-PCS | Mod: QW,S$GLB,,

## 2023-04-17 PROCEDURE — 87811 SARS-COV-2 COVID19 W/OPTIC: CPT | Mod: QW,S$GLB,,

## 2023-04-17 PROCEDURE — 99212 PR OFFICE/OUTPT VISIT, EST, LEVL II, 10-19 MIN: ICD-10-PCS | Mod: S$GLB,,,

## 2023-04-17 PROCEDURE — 99212 OFFICE O/P EST SF 10 MIN: CPT | Mod: S$GLB,,,

## 2023-04-17 NOTE — PATIENT INSTRUCTIONS
You have tested positive for COVID-19 today.      ISOLATION  If you tested positive and do not have symptoms, you must isolate for 5 days starting on the day of the positive test. I    If you tested positive and have symptoms, you must isolate for 5 days starting on the day of the first symptoms,  not the day of the positive test.     This is the most important part, both the CDC and the [facility] emphasize that you do not test out of isolation.     After 5 days, if your symptoms have improved and you have not had fever on day 5, you can return to the community on day 6- NO TESTING REQUIRED!      In fact, we do not retest if you were positive in the last 90 days.    After your 5 days of isolation are completed, the CDC recommends strict mask use for the first 5 days that you come out of isolation. PLEASE FOLLOW CDC GUIDELINES REGARDING TRAVEL AFTER COVID INFECTION.    Return to Urgent Care or go to ER if symptoms worsen or fail to improve.  Follow up with PCP as recommended for further management.     Your symptoms should begin to improve by Day 5 of the infection-- if symptoms worsen, you develop a new fever, shortness of breath, worsening shortness of breath with activity, chest pain, worsening cough, you must return to Urgent Care or go to the ER.    PLEASE READ YOUR DISCHARGE INSTRUCTIONS ENTIRELY AS IT CONTAINS IMPORTANT INFORMATION.      Please drink plenty of fluids.    Please get plenty of rest.    Please return here or go to the Emergency Department for any concerns or worsening of condition.      Tylenol or ibuprofen can also be used as directed for pain and fever unless you have an allergy to them or medical condition such as stomach ulcers, kidney or liver disease or blood thinners etc for which you should not be taking these type of medications.   YOU CAN ALTERNATE TYLENOL AND IBUPROFEN EVERY 3-4 HOURS. Take 1000mg (2 pills) of Extra Strength Acetaminophen (Tylenol) every 6 hours and 600mg (3 pills) of  Ibuprofen (Motrin/Advil) every 6 hours, alternating the two so that every 3 hours you take one or the other. Start with the Tylenol, then 3 hours later take the Ibuprofen, then 3 hours later take the Tylenol again, and so on.         For your allergy symptoms and/or runny nose, sinus/ear pressure, congestion:        - You can take over-the-counter claritin, zyrtec, allegra, or xyzal as directed. These are antihistamines that can help with runny nose, nasal congestion, sneezing, and helps to dry up post-nasal drip, which usually causes sore throat and cough.               - If you do NOT have high blood pressure, you may use a decongestant form (D)  of this medication (ie. Claritin- D, zyrtec-D, allegra-D) or if you do not take the D form, you can take sudafed (pseudoephedrine) over the counter, which is a decongestant. Do NOT take two decongestant (D) medications at the same time (such as mucinex-D and claritin-D or plain sudafed and claritin D). Dextromethorphan (DM) is a cough suppressant over the counter (ie. mucinex DM, robitussin, delsym; dayquil/nyquil has DM as well.)    -If you DO have high blood pressure, AVOID DECONGESTANTS (I.e., Phenylephrine and Pseudoephedrine). You may take Coricidin HBP for sinus congestion and Delsym for cough.        - You can take plain Mucinex (guaifenesin) 1200 mg twice a day to help loosen mucous.       Use over the counter flonase or nasocort: one spray each nostril twice daily OR two sprays each nostril once daily until nares dry out, unless you have Glaucoma.   If you find this dries your nose out or your nose bleeds, try using over the counter nasal saline a few minutes prior to using the flonase to moisten the lining of your nose and throughout the day as needed.   Flonase/nasal spray use directions:  1) Use once per day.  2) Blow nose first.  3) Close one nostril and spray flonase up the other nostril while inhaling gently.   4) If you inhale to aggressively, you will  have a bitter taste in the back of your mouth.       You can try breathe right strips at night to help you breathe.  A cool mist humidifier in bedroom may help with cough and relieve stuffy nose.     Sinus rinses DO NOT USE TAP WATER, if you must, water must be a rolling boil for 1 minute, let it cool, then use.  May use distilled water, or over the counter nasal saline rinses.  Vics vapor rub in shower to help open nasal passages.  May use nasal gel to keep passages moisturized.  May use Nasal saline sprays during the day for added relief of congestion.   For those who go to the gym, please do not use the sauna or steam room now to clear sinuses.      Sore throat recommendations: Warm fluids, warm salt water gargles, throat lozenges, tea, honey, soup, rest, hydration.      Cough     Honey with jeanna to soothe your throat    Robitussin or Delsyum for cough suppressant for dry cough.    Mucinex DM or products containing Guaifenesin or Dextromethorphan for expectorant (wet cough).    Take prescription cough meds (pills) as prescribed; take prescription cough syrup at night as needed for cough.  Do not take both the prescribed cough pills and syrup at the same time or within 6 hours of each other.  Do not take the cough syrup with any other sedative medication as it can can cause drowsiness. Do not operate any heavy machinery, drink or drive while taking the cough syrup.    Try taking half a dose first of the cough syrup to see how it affects you.       Please follow up with your primary care doctor or specialist in the next 48-72hrs as needed and if no improvement    If you  smoke, please stop smoking.      Please return or see your primary care doctor if you develop new or worsening symptoms.     Please arrange follow up with your primary medical clinic as soon as possible. You must understand that you've received an Urgent Care treatment only and that you may be released before all of your medical problems are  known or treated. You, the patient, will arrange for follow up as instructed. If your symptoms worsen or fail to improve you should go to the Emergency Room.

## 2023-04-17 NOTE — PROGRESS NOTES
"Subjective:      Patient ID: Rupal Chacon is a 47 y.o. female.    Vitals:  height is 5' 4" (1.626 m) and weight is 71.7 kg (158 lb). Her oral temperature is 98.4 °F (36.9 °C). Her blood pressure is 136/92 (abnormal) and her pulse is 86. Her respiration is 19 and oxygen saturation is 98%.     Chief Complaint: Cough    47 yr old female came in with complaints of a dry cough, body aches, and a low grade fever. Her symptoms started Saturday. States her  recently diagnosed with bronchitis. Denies chest pain, shortness of breath, sore throat, trouble swallowing, ear pain. States she gets relief with Ibuprofen and then dayquil/nyquil. Just wanted to rule out COVID. NKDA.     Cough  This is a new problem. The current episode started in the past 7 days. The problem has been gradually worsening. The problem occurs constantly. The cough is Productive of sputum. Associated symptoms include a fever, myalgias (generalized body aches) and nasal congestion. Pertinent negatives include no chest pain, chills, ear congestion, ear pain, eye redness, headaches, heartburn, hemoptysis, postnasal drip, rash, rhinorrhea, sore throat, shortness of breath, sweats, weight loss or wheezing. Nothing aggravates the symptoms. She has tried nothing for the symptoms.   Constitution: Positive for fatigue and fever. Negative for chills and generalized weakness.   HENT:  Positive for congestion (chest congestion). Negative for ear pain, ear discharge, postnasal drip, sinus pain, sinus pressure, sore throat, trouble swallowing and voice change.    Neck: Negative for neck pain and neck stiffness.   Cardiovascular:  Negative for chest pain.   Eyes:  Negative for eye discharge, eye pain and eye redness.   Respiratory:  Positive for cough. Negative for sputum production, bloody sputum, shortness of breath and wheezing.    Gastrointestinal:  Negative for heartburn.   Musculoskeletal:  Positive for muscle ache (generalized body aches).   Skin:  " Negative for rash.   Allergic/Immunologic: Negative for sneezing.   Neurological:  Negative for headaches.    Objective:     Vitals:    04/17/23 0821   BP: (!) 136/92   Pulse: 86   Resp: 19   Temp: 98.4 °F (36.9 °C)       Physical Exam   Constitutional: She is oriented to person, place, and time. She appears well-developed. She is cooperative.  Non-toxic appearance. She does not appear ill. No distress.   HENT:   Head: Normocephalic and atraumatic.   Ears:   Right Ear: Hearing, tympanic membrane, external ear and ear canal normal. impacted cerumen  Left Ear: Hearing, tympanic membrane, external ear and ear canal normal. impacted cerumen  Nose: Nose normal. No mucosal edema, rhinorrhea or nasal deformity. No epistaxis. Right sinus exhibits no maxillary sinus tenderness and no frontal sinus tenderness. Left sinus exhibits no maxillary sinus tenderness and no frontal sinus tenderness.   Mouth/Throat: Uvula is midline, oropharynx is clear and moist and mucous membranes are normal. Mucous membranes are moist. No trismus in the jaw. Normal dentition. No uvula swelling. No oropharyngeal exudate, posterior oropharyngeal edema, posterior oropharyngeal erythema or cobblestoning. No tonsillar exudate.   Eyes: Conjunctivae and lids are normal. Pupils are equal, round, and reactive to light. Right eye exhibits no discharge. Left eye exhibits no discharge. No scleral icterus.   Neck: Trachea normal and phonation normal. Neck supple. No edema present. No erythema present. No neck rigidity present.   Cardiovascular: Normal rate, regular rhythm, normal heart sounds and normal pulses.   Pulmonary/Chest: Effort normal and breath sounds normal. No respiratory distress. She has no decreased breath sounds. She has no wheezes. She has no rhonchi. She has no rales.   Abdominal: Normal appearance.   Musculoskeletal: Normal range of motion.         General: No deformity. Normal range of motion.   Lymphadenopathy:     She has no cervical  adenopathy.   Neurological: She is alert and oriented to person, place, and time. She exhibits normal muscle tone. Coordination normal.   Skin: Skin is warm, dry, intact, not diaphoretic and not pale.   Psychiatric: Her speech is normal and behavior is normal. Judgment and thought content normal.   Nursing note and vitals reviewed.    Assessment:     1. COVID-19    2. Cough, unspecified type    3. Body aches    4. Chest congestion      Results for orders placed or performed in visit on 04/17/23   SARS Coronavirus 2 Antigen, POCT Manual Read   Result Value Ref Range    SARS Coronavirus 2 Antigen Positive (A) Negative     Acceptable Yes        COVID risk score: 0    Plan:       COVID-19    Cough, unspecified type  -     SARS Coronavirus 2 Antigen, POCT Manual Read    Body aches    Chest congestion      Patient Instructions   You have tested positive for COVID-19 today.      ISOLATION  If you tested positive and do not have symptoms, you must isolate for 5 days starting on the day of the positive test. I    If you tested positive and have symptoms, you must isolate for 5 days starting on the day of the first symptoms,  not the day of the positive test.     This is the most important part, both the CDC and the [facility] emphasize that you do not test out of isolation.     After 5 days, if your symptoms have improved and you have not had fever on day 5, you can return to the community on day 6- NO TESTING REQUIRED!      In fact, we do not retest if you were positive in the last 90 days.    After your 5 days of isolation are completed, the CDC recommends strict mask use for the first 5 days that you come out of isolation. PLEASE FOLLOW CDC GUIDELINES REGARDING TRAVEL AFTER COVID INFECTION.    Return to Urgent Care or go to ER if symptoms worsen or fail to improve.  Follow up with PCP as recommended for further management.     Your symptoms should begin to improve by Day 5 of the infection-- if symptoms worsen,  you develop a new fever, shortness of breath, worsening shortness of breath with activity, chest pain, worsening cough, you must return to Urgent Care or go to the ER.    PLEASE READ YOUR DISCHARGE INSTRUCTIONS ENTIRELY AS IT CONTAINS IMPORTANT INFORMATION.      Please drink plenty of fluids.    Please get plenty of rest.    Please return here or go to the Emergency Department for any concerns or worsening of condition.      Tylenol or ibuprofen can also be used as directed for pain and fever unless you have an allergy to them or medical condition such as stomach ulcers, kidney or liver disease or blood thinners etc for which you should not be taking these type of medications.   YOU CAN ALTERNATE TYLENOL AND IBUPROFEN EVERY 3-4 HOURS. Take 1000mg (2 pills) of Extra Strength Acetaminophen (Tylenol) every 6 hours and 600mg (3 pills) of Ibuprofen (Motrin/Advil) every 6 hours, alternating the two so that every 3 hours you take one or the other. Start with the Tylenol, then 3 hours later take the Ibuprofen, then 3 hours later take the Tylenol again, and so on.         For your allergy symptoms and/or runny nose, sinus/ear pressure, congestion:        - You can take over-the-counter claritin, zyrtec, allegra, or xyzal as directed. These are antihistamines that can help with runny nose, nasal congestion, sneezing, and helps to dry up post-nasal drip, which usually causes sore throat and cough.               - If you do NOT have high blood pressure, you may use a decongestant form (D)  of this medication (ie. Claritin- D, zyrtec-D, allegra-D) or if you do not take the D form, you can take sudafed (pseudoephedrine) over the counter, which is a decongestant. Do NOT take two decongestant (D) medications at the same time (such as mucinex-D and claritin-D or plain sudafed and claritin D). Dextromethorphan (DM) is a cough suppressant over the counter (ie. mucinex DM, robitussin, delsym; dayquil/nyquil has DM as well.)    -If you  DO have high blood pressure, AVOID DECONGESTANTS (I.e., Phenylephrine and Pseudoephedrine). You may take Coricidin HBP for sinus congestion and Delsym for cough.        - You can take plain Mucinex (guaifenesin) 1200 mg twice a day to help loosen mucous.       Use over the counter flonase or nasocort: one spray each nostril twice daily OR two sprays each nostril once daily until nares dry out, unless you have Glaucoma.   If you find this dries your nose out or your nose bleeds, try using over the counter nasal saline a few minutes prior to using the flonase to moisten the lining of your nose and throughout the day as needed.   Flonase/nasal spray use directions:  1) Use once per day.  2) Blow nose first.  3) Close one nostril and spray flonase up the other nostril while inhaling gently.   4) If you inhale to aggressively, you will have a bitter taste in the back of your mouth.       You can try breathe right strips at night to help you breathe.  A cool mist humidifier in bedroom may help with cough and relieve stuffy nose.     Sinus rinses DO NOT USE TAP WATER, if you must, water must be a rolling boil for 1 minute, let it cool, then use.  May use distilled water, or over the counter nasal saline rinses.  Vics vapor rub in shower to help open nasal passages.  May use nasal gel to keep passages moisturized.  May use Nasal saline sprays during the day for added relief of congestion.   For those who go to the gym, please do not use the sauna or steam room now to clear sinuses.      Sore throat recommendations: Warm fluids, warm salt water gargles, throat lozenges, tea, honey, soup, rest, hydration.      Cough     Honey with jeanna to soothe your throat    Robitussin or Delsyum for cough suppressant for dry cough.    Mucinex DM or products containing Guaifenesin or Dextromethorphan for expectorant (wet cough).    Take prescription cough meds (pills) as prescribed; take prescription cough syrup at night as needed for  cough.  Do not take both the prescribed cough pills and syrup at the same time or within 6 hours of each other.  Do not take the cough syrup with any other sedative medication as it can can cause drowsiness. Do not operate any heavy machinery, drink or drive while taking the cough syrup.    Try taking half a dose first of the cough syrup to see how it affects you.       Please follow up with your primary care doctor or specialist in the next 48-72hrs as needed and if no improvement    If you  smoke, please stop smoking.      Please return or see your primary care doctor if you develop new or worsening symptoms.     Please arrange follow up with your primary medical clinic as soon as possible. You must understand that you've received an Urgent Care treatment only and that you may be released before all of your medical problems are known or treated. You, the patient, will arrange for follow up as instructed. If your symptoms worsen or fail to improve you should go to the Emergency Room.

## 2023-04-17 NOTE — LETTER
"    [address]  [place] [address]  Phone: [phone]  Fax: [phone]      Return to School    Rupal Chacon (Natalie) was seen and treated in our clinic on 4/17/2023.     COVID-19 is present in our communities across the [facility]. There is limited testing for COVID at this time, so not all patients can be tested. In this situation, your employee meets the following criteria:    Rupal Chacon has met the criteria for COVID-19 testing and has a POSITIVE result. She can return to school once they are asymptomatic for 24 hours without the use of fever reducing medications AND at least five days from the first positive result. A mask is recommended for 5 days post quarantine.     If you have any questions or concerns, or if I can be of further assistance, please do not hesitate to contact me.    Sincerely,           Rafaela Tavarez PA-C  "

## 2023-10-25 ENCOUNTER — PATIENT MESSAGE (OUTPATIENT)
Dept: DERMATOLOGY | Facility: CLINIC | Age: 48
End: 2023-10-25
Payer: COMMERCIAL

## 2023-11-22 ENCOUNTER — TELEPHONE (OUTPATIENT)
Dept: DERMATOLOGY | Facility: CLINIC | Age: 48
End: 2023-11-22
Payer: COMMERCIAL

## 2024-02-05 ENCOUNTER — PATIENT OUTREACH (OUTPATIENT)
Dept: ADMINISTRATIVE | Facility: HOSPITAL | Age: 49
End: 2024-02-05
Payer: COMMERCIAL

## 2024-02-05 NOTE — PROGRESS NOTES
2024 Care Everywhere updates requested and reviewed.  Immunizations reconciled. Media reports reviewed.  Duplicate HM overrides and  orders removed.  Overdue HM topic chart audit and/or requested.  Overdue lab testing linked to upcoming lab appointments if applies.       Health Maintenance Due   Topic Date Due    Hepatitis C Screening  Never done    Pneumococcal Vaccines (Age 0-64) (1 of 2 - PCV) Never done    HIV Screening  Never done    TETANUS VACCINE  Never done    Mammogram  Never done    Influenza Vaccine (1) 2023    COVID-19 Vaccine (2023- season) 2023    Colorectal Cancer Screening  2023

## 2025-06-16 ENCOUNTER — HOSPITAL ENCOUNTER (OUTPATIENT)
Dept: RADIOLOGY | Facility: HOSPITAL | Age: 50
Discharge: HOME | End: 2025-06-16
Attending: INTERNAL MEDICINE
Payer: COMMERCIAL

## 2025-06-16 DIAGNOSIS — Z12.31 ENCOUNTER FOR SCREENING MAMMOGRAM FOR BREAST CANCER: ICD-10-CM

## 2025-06-16 PROCEDURE — 77063 BREAST TOMOSYNTHESIS BI: CPT | Mod: 26,,, | Performed by: RADIOLOGY

## 2025-06-16 PROCEDURE — 77067 SCR MAMMO BI INCL CAD: CPT | Mod: 26,,, | Performed by: RADIOLOGY

## 2025-06-16 PROCEDURE — 77067 SCR MAMMO BI INCL CAD: CPT | Mod: TC

## 2025-06-19 ENCOUNTER — PATIENT MESSAGE (OUTPATIENT)
Dept: RADIOLOGY | Facility: HOSPITAL | Age: 50
End: 2025-06-19
Payer: COMMERCIAL

## 2025-06-19 ENCOUNTER — TELEPHONE (OUTPATIENT)
Dept: INTERNAL MEDICINE | Facility: CLINIC | Age: 50
End: 2025-06-19
Payer: COMMERCIAL

## 2025-06-19 NOTE — TELEPHONE ENCOUNTER
Patient called to schedule a mammogram, she has not been seen at [facility] in 2-3 years. She said he scheduled an appt to establish care with Dr. Calzada on 7/7/25 patient said she will be out of town the week of June 30. Patient was told she will need to establish care before test could be ordered because it's been some time since she has been seen. She said she understood and will keep her appt on 7/7/25 with Dr. Calzada she was also told Dr. Calzada is out of the office this week

## 2025-06-24 ENCOUNTER — HOSPITAL ENCOUNTER (OUTPATIENT)
Dept: RADIOLOGY | Facility: HOSPITAL | Age: 50
Discharge: HOME | End: 2025-06-24
Attending: INTERNAL MEDICINE
Payer: COMMERCIAL

## 2025-06-24 DIAGNOSIS — R92.8 ABNORMAL MAMMOGRAM: ICD-10-CM

## 2025-06-24 PROCEDURE — 77065 DX MAMMO INCL CAD UNI: CPT | Mod: 26,RT,, | Performed by: RADIOLOGY

## 2025-06-24 PROCEDURE — 77061 BREAST TOMOSYNTHESIS UNI: CPT | Mod: 26,RT,, | Performed by: RADIOLOGY

## 2025-06-24 PROCEDURE — 76642 ULTRASOUND BREAST LIMITED: CPT | Mod: 26,RT,, | Performed by: RADIOLOGY

## 2025-06-24 PROCEDURE — 77061 BREAST TOMOSYNTHESIS UNI: CPT | Mod: TC,RT

## 2025-06-24 PROCEDURE — 76642 ULTRASOUND BREAST LIMITED: CPT | Mod: TC,RT

## 2025-07-07 ENCOUNTER — OFFICE VISIT (OUTPATIENT)
Dept: INTERNAL MEDICINE | Facility: CLINIC | Age: 50
End: 2025-07-07
Payer: COMMERCIAL

## 2025-07-07 ENCOUNTER — LAB VISIT (OUTPATIENT)
Dept: LAB | Facility: OTHER | Age: 50
End: 2025-07-07
Attending: FAMILY MEDICINE
Payer: COMMERCIAL

## 2025-07-07 ENCOUNTER — RESULTS FOLLOW-UP (OUTPATIENT)
Dept: INTERNAL MEDICINE | Facility: CLINIC | Age: 50
End: 2025-07-07

## 2025-07-07 VITALS
DIASTOLIC BLOOD PRESSURE: 75 MMHG | BODY MASS INDEX: 31.27 KG/M2 | HEIGHT: 64 IN | HEART RATE: 88 BPM | WEIGHT: 183.19 LBS | SYSTOLIC BLOOD PRESSURE: 120 MMHG | OXYGEN SATURATION: 98 %

## 2025-07-07 DIAGNOSIS — K50.90 CROHN'S DISEASE WITHOUT COMPLICATION, UNSPECIFIED GASTROINTESTINAL TRACT LOCATION: ICD-10-CM

## 2025-07-07 DIAGNOSIS — R92.8 ABNORMAL MAMMOGRAM OF RIGHT BREAST: ICD-10-CM

## 2025-07-07 DIAGNOSIS — Z00.00 ANNUAL PHYSICAL EXAM: Primary | ICD-10-CM

## 2025-07-07 DIAGNOSIS — D24.1 FIBROADENOMA OF BREAST, RIGHT: ICD-10-CM

## 2025-07-07 DIAGNOSIS — N95.1 MENOPAUSAL SYMPTOM: ICD-10-CM

## 2025-07-07 DIAGNOSIS — Z00.00 ANNUAL PHYSICAL EXAM: ICD-10-CM

## 2025-07-07 LAB
ABSOLUTE EOSINOPHIL (OHS): 0.31 K/UL
ABSOLUTE MONOCYTE (OHS): 0.59 K/UL (ref 0.3–1)
ABSOLUTE NEUTROPHIL COUNT (OHS): 8.81 K/UL (ref 1.8–7.7)
ALBUMIN SERPL BCP-MCNC: 4 G/DL (ref 3.5–5.2)
ALP SERPL-CCNC: 92 UNIT/L (ref 40–150)
ALT SERPL W/O P-5'-P-CCNC: 29 UNIT/L (ref 10–44)
ANION GAP (OHS): 12 MMOL/L (ref 8–16)
AST SERPL-CCNC: 31 UNIT/L (ref 11–45)
BASOPHILS # BLD AUTO: 0.03 K/UL
BASOPHILS NFR BLD AUTO: 0.3 %
BILIRUB SERPL-MCNC: 0.2 MG/DL (ref 0.1–1)
BUN SERPL-MCNC: 14 MG/DL (ref 6–20)
CALCIUM SERPL-MCNC: 9.6 MG/DL (ref 8.7–10.5)
CHLORIDE SERPL-SCNC: 104 MMOL/L (ref 95–110)
CHOLEST SERPL-MCNC: 198 MG/DL (ref 120–199)
CHOLEST/HDLC SERPL: 3.3 {RATIO} (ref 2–5)
CO2 SERPL-SCNC: 24 MMOL/L (ref 23–29)
CREAT SERPL-MCNC: 0.8 MG/DL (ref 0.5–1.4)
EAG (OHS): 108 MG/DL (ref 68–131)
ERYTHROCYTE [DISTWIDTH] IN BLOOD BY AUTOMATED COUNT: 13.2 % (ref 11.5–14.5)
FERRITIN SERPL-MCNC: 15 NG/ML (ref 20–300)
GFR SERPLBLD CREATININE-BSD FMLA CKD-EPI: >60 ML/MIN/1.73/M2
GLUCOSE SERPL-MCNC: 81 MG/DL (ref 70–110)
HBA1C MFR BLD: 5.4 % (ref 4–5.6)
HCT VFR BLD AUTO: 40.9 % (ref 37–48.5)
HDLC SERPL-MCNC: 60 MG/DL (ref 40–75)
HDLC SERPL: 30.3 % (ref 20–50)
HGB BLD-MCNC: 13.6 GM/DL (ref 12–16)
IMM GRANULOCYTES # BLD AUTO: 0.07 K/UL (ref 0–0.04)
IMM GRANULOCYTES NFR BLD AUTO: 0.6 % (ref 0–0.5)
IRON SATN MFR SERPL: 12 % (ref 20–50)
IRON SERPL-MCNC: 45 UG/DL (ref 30–160)
LDLC SERPL CALC-MCNC: 117.8 MG/DL (ref 63–159)
LYMPHOCYTES # BLD AUTO: 1.39 K/UL (ref 1–4.8)
MCH RBC QN AUTO: 29.4 PG (ref 27–31)
MCHC RBC AUTO-ENTMCNC: 33.3 G/DL (ref 32–36)
MCV RBC AUTO: 89 FL (ref 82–98)
NONHDLC SERPL-MCNC: 138 MG/DL
NUCLEATED RBC (/100WBC) (OHS): 0 /100 WBC
PLATELET # BLD AUTO: 346 K/UL (ref 150–450)
PMV BLD AUTO: 10.1 FL (ref 9.2–12.9)
POTASSIUM SERPL-SCNC: 4.1 MMOL/L (ref 3.5–5.1)
PROT SERPL-MCNC: 8 GM/DL (ref 6–8.4)
RBC # BLD AUTO: 4.62 M/UL (ref 4–5.4)
RELATIVE EOSINOPHIL (OHS): 2.8 %
RELATIVE LYMPHOCYTE (OHS): 12.4 % (ref 18–48)
RELATIVE MONOCYTE (OHS): 5.3 % (ref 4–15)
RELATIVE NEUTROPHIL (OHS): 78.6 % (ref 38–73)
SODIUM SERPL-SCNC: 140 MMOL/L (ref 136–145)
T4 FREE SERPL-MCNC: 1.02 NG/DL (ref 0.71–1.51)
T4 FREE SERPL-MCNC: 1.02 NG/DL (ref 0.71–1.51)
TIBC SERPL-MCNC: 361 UG/DL (ref 250–450)
TRANSFERRIN SERPL-MCNC: 244 MG/DL (ref 200–375)
TRIGL SERPL-MCNC: 101 MG/DL (ref 30–150)
TSH SERPL-ACNC: 2.21 UIU/ML (ref 0.4–4)
WBC # BLD AUTO: 11.2 K/UL (ref 3.9–12.7)

## 2025-07-07 PROCEDURE — 80061 LIPID PANEL: CPT

## 2025-07-07 PROCEDURE — 1159F MED LIST DOCD IN RCRD: CPT | Mod: CPTII,S$GLB,, | Performed by: FAMILY MEDICINE

## 2025-07-07 PROCEDURE — 83540 ASSAY OF IRON: CPT

## 2025-07-07 PROCEDURE — 82947 ASSAY GLUCOSE BLOOD QUANT: CPT

## 2025-07-07 PROCEDURE — 83036 HEMOGLOBIN GLYCOSYLATED A1C: CPT

## 2025-07-07 PROCEDURE — 99999 PR PBB SHADOW E&M-EST. PATIENT-LVL V: CPT | Mod: PBBFAC,,, | Performed by: FAMILY MEDICINE

## 2025-07-07 PROCEDURE — 84439 ASSAY OF FREE THYROXINE: CPT

## 2025-07-07 PROCEDURE — 36415 COLL VENOUS BLD VENIPUNCTURE: CPT

## 2025-07-07 PROCEDURE — 3074F SYST BP LT 130 MM HG: CPT | Mod: CPTII,S$GLB,, | Performed by: FAMILY MEDICINE

## 2025-07-07 PROCEDURE — 85025 COMPLETE CBC W/AUTO DIFF WBC: CPT

## 2025-07-07 PROCEDURE — 82728 ASSAY OF FERRITIN: CPT

## 2025-07-07 PROCEDURE — 3078F DIAST BP <80 MM HG: CPT | Mod: CPTII,S$GLB,, | Performed by: FAMILY MEDICINE

## 2025-07-07 PROCEDURE — 3008F BODY MASS INDEX DOCD: CPT | Mod: CPTII,S$GLB,, | Performed by: FAMILY MEDICINE

## 2025-07-07 PROCEDURE — 1160F RVW MEDS BY RX/DR IN RCRD: CPT | Mod: CPTII,S$GLB,, | Performed by: FAMILY MEDICINE

## 2025-07-07 PROCEDURE — 99386 PREV VISIT NEW AGE 40-64: CPT | Mod: S$GLB,,, | Performed by: FAMILY MEDICINE

## 2025-07-07 NOTE — PATIENT INSTRUCTIONS
Amitriptyline (Elavil) potential med    Here are some therapist/psychologist recommendations    Psychologytoday.com  This is a great site where you can plug in your insurance and find a good fit based on coverage and location.    Community psychiatrists:   Rebeka Malloy (psychiatrist) [address] [facility] [address] Phone: [phone]   Andrew Hernandez (psychiatrist) [phone], [phone] [address] [facility]   Dr. Miguel Vivar - [phone]   Dr. Karla Bailey - [phone]     [facility]: [phone]     Therapy/Psychology:   You can try anyone of these number to see if your insurance is accepted or you would have to call your insurance.     Cognitive Behavioral Therapy (CBT) Center [facility]   Address: [facility] [place] [address]   Phone: [phone]   Www.Tape TV     Integrated Behavioral Health [address], [address]   Phone: [phone]   You can email for an appointment at: [email or web address]     Walk and Talk [facility]   [address] [place] [address], [place] [address], [facility], [address]   [email or web address]   Dr. Shanell Elam, [phone] or [email or web address]   Dr. Asuncion Linares, [phone] or [email or web address]     Lisa Peter LCS (therapist) [phone] [address]    LCSW (therapist) [phone]   [address] [place] [address]   Sulma Busch   LCSW (therapist) [phone]   [address] [facility]   Kerline Asif LCSW (therapist) [phone]   [address] [place] [address]   SWETHA Hernandez         LCSW                    [phone]   Tera Landa [phone] (therapist) [address] [facility]   Leandro Busch (therapist) [phone]  [address] [facility] [address] (therapist) [phone] [address] [facility]     Behavior [facility]ing [phone]   [address] [place] [address]     Employee Assistance Program (EAP)   Check through your employer's HR.     [facility]  Behavioral Health (accepts medicaid)  [address]  [phone]  or [phone].[facility] ([phone])    Online Therapist:     [email or web address]     Free Guided Meditations   [email or web address]   [email or web address]/[email or web address]   https://health.[facility]/specialties/mindfulness/programs/mbsr/pages/audio.aspx

## 2025-07-07 NOTE — PROGRESS NOTES
Subjective:      Patient ID: Rupal Chacon is a 50 y.o. female.    Chief Complaint: Establish Care      History of Present Illness    CHIEF COMPLAINT:  - Ms. Chacon presents for an establishment visit and to discuss recent mammogram results, with additional concerns about menopausal symptoms and anxiety.    HPI:  Ms. Chacon had a mammogram in mid-June, which showed spots on the right breast. She was called back for a closer look and an ultrasound, which revealed a fibroadenoma. She was advised to return for a follow up in 6 months.    Ms. Chacon reports menopausal symptoms for approximately the past 6 months, including trouble sleeping and hot flashes. She has a pattern of waking up at 3:00 AM, which she partly attributes to anxiety. She has implemented sleep hygiene practices and uses mouth tape to address potential mouth breathing, which has helped to some extent. The hot flashes have improved and almost resolved recently.  She reports weight gain of about 20 lbs over the past 3-4 years. She describes easy falling asleep but difficulty staying asleep, with occasional feelings of heightened anxiety during the night. These symptoms have improved, and she can now usually return to sleep when she wakes up.    Ms. Chacon mentions bloating related to her Crohn's disease, though she has been able to manage it and notes that stress is a significant factor in flare-ups. She recently had an episode of nausea and GI distress after eating a sandwich while out of town, accompanied by intense itching on her hands that spread to her head. This resolved with Benadryl. She has not had any issues with her Crohn's disease recently. No blood in stool, no persistent abdominal pain, no significant unintentional weight loss.    Ms. Chacon reports a history of low iron and has not had labs drawn recently.    She expresses concern about her anxiety, which she feels has worsened due to current world events. Her anxiety affects her  sleep and other aspects of her life. She has not seen a therapist in about a year but is considering returning to therapy.    MEDICATIONS:  - Omega-3 supplement  - Vitamin D supplement  - B complex vitamin    MEDICAL HISTORY:  - Crohn's disease  - Anxiety  - Precancerous cells on cervix  - Flu shot received in December of last year  - COVID-19 vaccine received in December of last year  - Menopause: Yes  - Hysterectomy: Yes  - History of abnormal Pap: Yes, precancerous cells for a few years  - Pregnancy status: Denies possibility of pregnancy  - P2    SURGICAL HISTORY:  - Hysterectomy: Date not specified, indication was persistent precancerous cells on cervix  - Spinal fusion: 1990    FAMILY HISTORY:  - Siblings: History of sleep apnea mentioned    SOCIAL HISTORY:  - Alcohol: Social drinker, less than once a month, 1-2 drinks at a time  Denies smoking, chewing tobacco, or vaping  - Occupation:     IMAGING:  - Mammogram: mid-June 2025, spots seen on right breast, left breast fine  - Breast ultrasound: after mammogram, closer look performed, fibroadenoma identified          Problem List[1]     Current Medications[2]  Review of patient's allergies indicates:  No Known Allergies    Past Surgical History:   Procedure Laterality Date    COLD KNIFE CONIZATION OF CERVIX N/A 03/09/2020    Procedure: CONE BIOPSY, CERVIX, USING COLD KNIFE;  Surgeon: Aneta Mcdonald MD;  Location: [facility];  Service: OB/GYN;  Laterality: N/A;    COLONOSCOPY      x 2    COLPOSCOPY  2013    HYSTERECTOMY      SPINAL FUSION  15 yo    scoliosis    thoracic spinal fusion      scoliosis    VAGINAL DELIVERY      x 2        Family History   Problem Relation Name Age of Onset    Diabetes Mother oral rx     Hypertension Mother oral rx     Hyperlipidemia Mother oral rx     Hypertension Father      Cancer Paternal Grandfather          leukemia    Melanoma Neg Hx      Psoriasis Neg Hx      Lupus Neg Hx          Social History[3]     Lab Results  "  Component Value Date     (L) 10/28/2013    K 3.7 10/28/2013     10/28/2013    CO2 23 10/28/2013    GLU 91 10/28/2013    BUN 11 10/28/2013    CREATININE 0.8 10/28/2013    CALCIUM 9.0 10/28/2013    PROT 7.3 10/28/2013    ALBUMIN 3.6 10/28/2013    BILITOT 0.3 10/28/2013    ALKPHOS 82 10/28/2013    AST 23 10/28/2013    ALT 19 10/28/2013    ANIONGAP 8 10/28/2013    ESTGFRAFRICA >60.0 10/28/2013    EGFRNONAA >60.0 10/28/2013    WBC 10.81 03/02/2020    HGB 13.4 03/02/2020    HGB 11.2 (L) 10/28/2013    HCT 40.4 03/02/2020    MCV 89 03/02/2020     03/02/2020    TSH 2.487 10/28/2013       No results found for: "HGBA1C"  No results found for: "MICALBCREAT"  Lab Results   Component Value Date    LDLCALC 85.4 10/28/2013    CHOL 146 10/28/2013    HDL 53 10/28/2013    TRIG 38 10/28/2013       Review of Systems   Constitutional:  Negative for appetite change, chills, fever and unexpected weight change.   HENT:  Negative for ear pain, sinus pressure/congestion and sore throat.    Eyes:  Negative for visual disturbance.   Respiratory:  Negative for shortness of breath and wheezing.    Cardiovascular:  Negative for chest pain, palpitations and leg swelling.   Gastrointestinal:  Negative for abdominal pain, blood in stool, change in bowel habit, constipation, diarrhea and vomiting.   Genitourinary:  Negative for dysuria and hematuria.   Musculoskeletal:  Negative for arthralgias.   Integumentary:  Negative for rash.   Neurological:  Negative for dizziness and headaches.   Psychiatric/Behavioral:  Positive for sleep disturbance. Negative for depressed mood. The patient is nervous/anxious.         Vitals:    07/07/25 0857   BP: 120/75   Pulse: 88   SpO2: 98%   Weight: 83.1 kg (183 lb 3.2 oz)   Height: 5' 4" (1.626 m)   PainSc: 0-No pain     Objective:   Physical Exam    General: Pleasant. No acute distress. Well-developed. Well-nourished.  Eyes: EOMBI. Sclerae anicteric.  HENT: Normocephalic. Atraumatic. Nares " patent. Moist oral mucosa.  Cardiovascular: Regular rate and rhythm. No murmurs. No gallops. No rubs. Normal S1 and S2.  Respiratory: Normal respiratory effort. Clear to auscultation bilaterally. No rales. No rhonchi. No wheezing.  Abdomen: Soft. Nontender. Nondistended. Normoactive bowel sounds.  Musculoskeletal: No obvious deformity. Normal range of motion.  Extremities: No lower extremity edema.  Neurological: Alert and lucid. Normal gait. No gross deficits.  Psychiatric: Normal mood. Normal affect. Good insight. Good judgment.  Skin: Warm. Intact.        Assessment/Plan       ICD-10-CM ICD-9-CM   1. Annual physical exam  Z00.00 V70.0   2. Abnormal mammogram of right breast  R92.8 793.80   3. Fibroadenoma of breast, right  D24.1 217   4. Crohn's disease without complication, unspecified gastrointestinal tract location  K50.90 555.9   5. Menopausal symptom  N95.1 627.2        Assessment & Plan     Reviewed mammogram results from mid-June showing benign fibroadenoma in right breast; left breast normal.   Assessed improving menopausal symptoms including hot flashes, sleep disturbances, and anxiety.   Assessed recent weight gain of approximately 20 lbs over 3-4 years.   Evaluated need for anxiety management, weighing options of counseling and medication.  Annual physical exam  Comments:  Physical performed. Obtain lab as noted. Discussed age appropriate health screening and immunizations.  Orders:  -     Hemoglobin A1C; Future  -     CBC Auto Differential; Future  -     Comprehensive Metabolic Panel; Future  -     Lipid Panel; Future  -     T4, Free; Future  -     TSH; Future  -     Iron and TIBC; Future; Expected date: 07/07/2025  -     Ferritin; Future; Expected date: 07/07/2025    Abnormal mammogram of right breast  Comments:  Repeat diagnostic mammogram and US order placed for Dec 2025 to follow up.  Orders:  -     US Breast Right Limited; Future; Expected date: 12/01/2025  -     Mammo Digital Diagnostic Right;  Future; Expected date: 12/01/2025    Fibroadenoma of breast, right  -     US Breast Right Limited; Future; Expected date: 12/01/2025  -     Mammo Digital Diagnostic Right; Future; Expected date: 12/01/2025    Crohn's disease without complication, unspecified gastrointestinal tract location  Comments:  Refer to GI  Orders:  -     Ambulatory referral/consult to Gastroenterology; Future; Expected date: 07/14/2025  -     Iron and TIBC; Future; Expected date: 07/07/2025  -     Ferritin; Future; Expected date: 07/07/2025    Menopausal symptom  Comments:  Refer to gyn. Obtain labs. Discussed briefly antidepressant for anxiety and provided counseling resources.  Orders:  -     Ambulatory referral/consult to Gynecology; Future; Expected date: 07/14/2025           Chronic conditions status updated as per HPI.  Other than changes above, continue current medications and maintain follow up with specialists.      Follow up in about 2 months (around 9/16/2025) for f/u anxiety.         Tabitha Calzada MD  [facility]    This note was generated with the assistance of ambient listening technology. Verbal consent was obtained by the patient and accompanying visitor(s) for the recording of patient appointment to facilitate this note. I attest to having reviewed and edited the generated note for accuracy, though some syntax or spelling errors may persist. Please contact the author of this note for any clarification.       Patient Instructions   Amitriptyline (Elavil) potential med    Here are some therapist/psychologist recommendations    Art of Defence.com  This is a great site where you can plug in your insurance and find a good fit based on coverage and location.    Community psychiatrists:   Rebeka Malloy (psychiatrist) [address] [facility] [address] Phone: [phone]   Andrew Hernandez (psychiatrist) [phone], [phone] [address] [facility]   Dr. Miguel Vivar - [phone]   Dr. Karla Bailey - [phone]      [facility]: [phone]     Therapy/Psychology:   You can try anyone of these number to see if your insurance is accepted or you would have to call your insurance.     Cognitive Behavioral Therapy (CBT) Center [facility]   Address: [facility] [place] [address]   Phone: [phone]   Www.[employer]     Integrated Behavioral Health [address], [address]   Phone: [phone]   You can email for an appointment at: [email or web address] for Google Glass     Walk and Talk [facility] Professional Counseling   [address] [place] [address] [address], [facility], [address]   [email or web address]   Dr. Shanell Elam, [phone] or [email or web address]   Dr. Asuncion Linares, [phone] or [email or web address]     Lisa Peter LCS (therapist) [phone] [address]    LCSW (therapist) [phone]   [address] [facility]   Sulma Busch   LCSW (therapist) [phone]   [address] [facility]   Kerline Asif LCSW (therapist) [phone]   [address] [facility]   SARASara Hernandez         LCSW                    [phone]   Tera Landa [phone] (therapist) [address] [facility]   Leandro Busch (therapist) [phone]  [address] Ceballos (therapist) [phone] [address] [facility]     Behavior Health Counseling [phone]   [facility][note 3][address] [place] [address]     Employee Assistance Program (EAP)   Check through your employer's HR.     [facility] Behavioral Health (accepts medicaid)  [address]  [phone]  or [phone] ([phone])    Online Therapist:     [email or web address]/     Free Guided Meditations   Https://[email or web address].[email or web address]/audio   [email or web address].org/chava/[email or web address]?[email or web address]   https://health.[facility]/specialties/mindfulness/programs/mbsr/pages/audio.aspx      Tests to Keep You Healthy    Mammogram: Met on 6/24/2025  Colon Cancer Screening: DUE      Immunization History   Administered Date(s)  Administered    COVID-19, MRNA, LN-S, PF (Pfizer) (Gray Cap) 05/22/2022    COVID-19, MRNA, LN-S, PF (Pfizer) (Purple Cap) 10/12/2021    Influenza - Quadrivalent - MDCK - PF 10/05/2020    Influenza - Quadrivalent - PF *Preferred* (6 months and older) 10/14/2019, 10/12/2021    Influenza Split 10/29/2012                                     [1]   Patient Active Problem List  Diagnosis    Crohn disease    Preop testing   [2]   Current Outpatient Medications:     FLUCELVAX QUAD [id number], PF, 60 mcg (15 mcg x 4)/0.5 mL Syrg, , Disp: , Rfl:     omega-3 fatty acids/fish oil (FISH OIL-OMEGA-3 FATTY ACIDS) 300-1,000 mg capsule, Take by mouth., Disp: , Rfl:     PFIZER COVID BIVAL,12Y UP,,PF, 30 mcg/0.3 mL injection, , Disp: , Rfl:     vitamin D (VITAMIN D3) 1000 units Tab, Take 1,000 Units by mouth once daily., Disp: , Rfl:   [3]   Social History  Socioeconomic History    Marital status:    Occupational History    Occupation:      Employer: [employer]   Tobacco Use    Smoking status: Never     Passive exposure: Never    Smokeless tobacco: Never   Substance and Sexual Activity    Alcohol use: Yes     Comment: once every 1-2 months, 1-2 drinks at a time    Drug use: No    Sexual activity: Yes     Partners: Male     Birth control/protection: None     Social Drivers of Health     Financial Resource Strain: Low Risk  (7/7/2025)    Overall Financial Resource Strain (CARDIA)     Difficulty of Paying Living Expenses: Not hard at all   Food Insecurity: No Food Insecurity (7/7/2025)    Hunger Vital Sign     Worried About Running Out of Food in the Last Year: Never true     Ran Out of Food in the Last Year: Never true   Transportation Needs: No Transportation Needs (7/7/2025)    PRAPARE - Transportation     Lack of Transportation (Medical): No     Lack of Transportation (Non-Medical): No   Physical Activity: Sufficiently Active (7/7/2025)    Exercise Vital Sign     Days of Exercise per Week: 6 days     Minutes  of Exercise per Session: 60 min   Stress: Stress Concern Present (7/7/2025)    [place] citizen [employer] - Occupational Stress Questionnaire     Feeling of Stress : To some extent   Housing Stability: Low Risk  (7/7/2025)    Housing Stability Vital Sign     Unable to Pay for Housing in the Last Year: No     Number of Times Moved in the Last Year: 0     Homeless in the Last Year: No

## 2025-07-09 ENCOUNTER — PATIENT MESSAGE (OUTPATIENT)
Dept: INTERNAL MEDICINE | Facility: CLINIC | Age: 50
End: 2025-07-09
Payer: COMMERCIAL

## 2025-07-11 ENCOUNTER — TELEPHONE (OUTPATIENT)
Dept: OBSTETRICS AND GYNECOLOGY | Facility: CLINIC | Age: 50
End: 2025-07-11
Payer: COMMERCIAL

## 2025-07-28 ENCOUNTER — PATIENT MESSAGE (OUTPATIENT)
Dept: INTERNAL MEDICINE | Facility: CLINIC | Age: 50
End: 2025-07-28
Payer: COMMERCIAL

## (undated) DEVICE — BRIEF MESH XLARGE GREEN BULK MB5100

## (undated) DEVICE — SOLUTION PREP IODINE 4OZ

## (undated) DEVICE — SOLUTION IRRI NS BOTTLE 1000ML R5200-01

## (undated) DEVICE — TRAY GENERAL SURGERY

## (undated) DEVICE — SOLUTION SCRUB IODINE 4OZ

## (undated) DEVICE — CATHETER URETHRAL RED 16FR

## (undated) DEVICE — DRESSING TELFA 3X8  1238

## (undated) DEVICE — PAD BOVIE ADULT

## (undated) DEVICE — GLOVE BIOGEL PI ULTRA TOUCH G GRAY SZ 7

## (undated) DEVICE — UNDERGLOVE BIOGEL PI MICRO BLUE SZ 7.5

## (undated) DEVICE — PAD MATERNITY

## (undated) DEVICE — BLADE SCALPEL #11 371111

## (undated) DEVICE — PACK LITHOTOMY 88521

## (undated) DEVICE — COVER LIGHT HANDLE LB53

## (undated) DEVICE — ELECTRODE WIRE BALL 5MM E1564

## (undated) DEVICE — SUTURE SILK 2-0 SH 30 K833H